# Patient Record
Sex: FEMALE | URBAN - METROPOLITAN AREA
[De-identification: names, ages, dates, MRNs, and addresses within clinical notes are randomized per-mention and may not be internally consistent; named-entity substitution may affect disease eponyms.]

---

## 2023-10-23 ENCOUNTER — TELEPHONE (OUTPATIENT)
Dept: FAMILY MEDICINE | Facility: CLINIC | Age: 63
End: 2023-10-23
Payer: MEDICAID

## 2023-10-23 NOTE — TELEPHONE ENCOUNTER
----- Message from Jaimee Blanchard sent at 10/23/2023 10:49 AM CDT -----  Regarding: refill  [facility]  is requesting a refill on atenolol 50 mg tablet 2x daily to be refill on behalf of patient